# Patient Record
Sex: FEMALE | Race: WHITE | NOT HISPANIC OR LATINO | URBAN - METROPOLITAN AREA
[De-identification: names, ages, dates, MRNs, and addresses within clinical notes are randomized per-mention and may not be internally consistent; named-entity substitution may affect disease eponyms.]

---

## 2023-10-06 ENCOUNTER — ANCILLARY PROCEDURE (OUTPATIENT)
Dept: RADIOLOGY | Facility: CLINIC | Age: 48
End: 2023-10-06
Payer: COMMERCIAL

## 2023-10-06 DIAGNOSIS — M25.552 PAIN IN LEFT HIP: ICD-10-CM

## 2023-10-06 PROCEDURE — 73502 X-RAY EXAM HIP UNI 2-3 VIEWS: CPT | Mod: LT

## 2024-04-30 NOTE — PROGRESS NOTES
Hina Ashby MD   Adult Reconstruction and Joint Replacement Surgery  Phone: [phone]     Fax: [phone]     INITIAL CONSULTATION      Date of Visit:  5/6/2024    CC: Left hip pain    HPI:  Lisa Prater is a 48 y.o. female who presents with 2 years of LEFT hip pain. They were referred by self. Feels she also has some secondary right foot pain.     Patient has tried the following Ice, Activity modification, and Xray. Date of last steroid injection: never. Patient does have pain at night. Patient is able to walk 2-3 blocks. Patient is currently using nothing as assistive device. Primarily complains of groin, buttock, and lateral hip pain. Patient has difficulty with donning and doffing shoes and socks, stairs, and getting in/out of car . The pain is significantly impacting her ability to perform activities of daily living. Patient reports no longer able to do activities such as walking without pain. Left leg feels shorter.    PROMs   No questionnaires on file.     No past medical history on file.  Documented in chart and reviewed.     No past surgical history on file.    Allergies: She has No Known Allergies.     Medications:  Current Outpatient Medications   Medication Instructions    buPROPion XL (Wellbutrin XL) 300 mg 24 hr tablet     DULoxetine (Cymbalta) 60 mg DR capsule     LORazepam (ATIVAN) 1 mg, oral, Daily PRN       No family history on file.  Documented in chart and reviewed.     Social History     Tobacco Use    Smoking status: Former     Types: Cigarettes    Smokeless tobacco: Former   Substance Use Topics    Alcohol use: Not on file       Review of Systems: Review of systems completed with medical assistant intake. Please refer to this note.     Falls: The patient denies any recent falls or fall-related injuries.    Physical Exam:  BMI: >40    Constitutional: The patient is well-appearing and well groomed.     Neurological/Psychiatric: The patient is alert and oriented to person, place  and time. The patient has a normal mood and affect.    Skin Examination: The skin over the right lower extremity, left lower extremity, right upper extremity, and left upper extremity is intact without any evidence of infection or rash.    Cardiovascular Examination: There are no varicosities and the skin is normal temperature, capillary refill normal, arterial pulses normal, no edema.    Lymphatic Examination: There is no lymphatic swelling or palpable lymph nodes present around the involved joint.    Neurological Examination: Bilateral lower extremities are grossly neurologically intact. Sensation normal, motor function normal.    Gait: The patient ambulates with a coxalgic gait.     Lumbar spine:    No tenderness to palpation midline.    Negative straight leg raise bilaterally.    Left Hip Examination:    Examination of the hip reveals the skin to be intact.    There is mild tenderness over the greater trochanter.    There is no obvious swelling.    There is a positive Stinchfield test.    Range of motion is: full extension to 90 degrees of flexion.    The hip internally rotates to 10 degrees and externally rotates to 35 degrees.    Abduction is 35 degrees and adduction is 10 degrees.    There is groin and buttock pain with hip motion.    Right Hip Examination:    Examination of the hip reveals the skin to be intact.    There is no tenderness over the greater trochanter.    There is no obvious swelling.    There is a negative Stinchfield test.    Range of motion is full extension to 100 degrees of flexion.    The hip internally rotates to 20 and externally rotates 40 degrees.    Abduction is 50 degrees and adduction is 20 degrees.    There is no groin and buttock pain with hip motion.    Right Knee Examination:  Examination of the right knee reveals the skin to be intact. There is no obvious swelling.    There is no tenderness to palpation.    Range of motion is full extension to 120 degrees of flexion.    The  "knee is stable.    There is no grinding with range of motion.    There is no patellofemoral crepitus.    Left Knee Examination:  Examination of the left knee reveals the skin to be intact. There is no obvious swelling.    There is no tenderness to palpation.    Range of motion is full extension to 120 degrees of flexion.    The knee is stable.    There is no grinding with range of motion.    There is no patellofemoral crepitus.    Prior Labs:   No results found for: \"WBC\", \"HGB\", \"HCT\", \"MCV\", \"PLT\"   No results found for: \"INR\", \"PROTIME\"      No results found for: \"GLUCOSE\", \"CALCIUM\", \"NA\", \"K\", \"CO2\", \"CL\", \"BUN\", \"CREATININE\"   No results found for: \"CKTOTAL\", \"CKMB\", \"CKMBINDEX\", \"TROPONINI\"   No results found for: \"HGBA1C\"      No results found for: \"CRP\"   No results found for: \"SEDRATE\"     Radiographs:  Radiographs were personally reviewed today with the patient. There is evidence of severe LEFT hip osteoarthritis with bone on bone apposition.    Impression:  48 y.o. year old female presents with severe LEFT hip osteoarthritis with bone on bone apposition. This is now affecting activities of daily living.     Diagnosis:   Primary osteoarthritis of left hip    Obesity, morbid (more than 100 lbs over ideal weight or BMI > 40) (Multi)    Recommendations / Plan:    I have discussed the options in detail with the patient. We have discussed anti-inflammatory medication, activity modification, physical therapy, corticosteroid injections, and total hip replacement surgery. The patient has not yet exhausted all conservative treatment measures.     The risks and benefits of all these treatment options have been discussed in detail. The patient has tried at least 3 months of the above conservative treatments and continues to have disabling pain, impaired activities of daily living and worsened quality of life.  Reviewed the surgical optimization steps to optimize their chances for a successful joint replacement " surgery.  Currently their BMI is >40.  They would need to lose significant weight before being scheduled for surgery. Discussed that obesity is a risk factor for continued progression of osteoarthritis. Each pound of weight loss offloads their hip and knee joints by 3-6 pounds.  The most effective of these options is weight loss mainly through restricting caloric intake.  A referral to a nutritionist was offered and referral was placed.  She was also given a prescription for physical therapy to begin working on strengthening and range of motion of the hip and knee joints.  Discussed the potential for steroid injection to the hip joint at some point in the future.  She will use ibuprofen for management of pain.  Encouraged them to maintain range of motion and strength around the hip and knee joints.  They will continue to implement these strategies in addressing their pain.       Recommend the patient continue optimizing nonsurgical treatment interventions as outlined above for management of their arthritis.  I would be happy to see them again at any point to discuss surgery if they are more optimized.  The patient verbalizes understanding with the recommendations and treatment plan as outlined above and is in agreement.  Questions were addressed.    _____________  Hina Ashby MD   [facility]     Approximately 45 minutes were spent on the following tasks:              Preparing for the patient              Reviewing medical records              Taking a patient history              Performing a physical exam              Reviewing treatment options with the patient              Explaining the risks, potential benefits, and alternative to surgery  Explaining the expected rehabilitation after each treatment option  Explaining the potential long term expectations  Evaluating the diagnostic imaging     This office note was transcribed with dictation software.  Please  excuse any typographical errors, program misunderstandings leading to inadvertent insertions or deletions of inappropriate wording, pronoun errors and other unintentional transcription errors not noticed on proof-reading.

## 2024-05-06 ENCOUNTER — OFFICE VISIT (OUTPATIENT)
Dept: ORTHOPEDIC SURGERY | Facility: HOSPITAL | Age: 49
End: 2024-05-06
Payer: COMMERCIAL

## 2024-05-06 DIAGNOSIS — M16.12 PRIMARY OSTEOARTHRITIS OF LEFT HIP: Primary | ICD-10-CM

## 2024-05-06 DIAGNOSIS — E66.01 OBESITY, MORBID (MORE THAN 100 LBS OVER IDEAL WEIGHT OR BMI > 40) (MULTI): ICD-10-CM

## 2024-05-06 PROCEDURE — 99214 OFFICE O/P EST MOD 30 MIN: CPT | Performed by: STUDENT IN AN ORGANIZED HEALTH CARE EDUCATION/TRAINING PROGRAM

## 2024-05-06 PROCEDURE — 99204 OFFICE O/P NEW MOD 45 MIN: CPT | Performed by: STUDENT IN AN ORGANIZED HEALTH CARE EDUCATION/TRAINING PROGRAM

## 2024-05-06 RX ORDER — BUPROPION HYDROCHLORIDE 300 MG/1
TABLET ORAL
COMMUNITY
Start: 2024-02-26

## 2024-05-06 RX ORDER — DULOXETIN HYDROCHLORIDE 60 MG/1
CAPSULE, DELAYED RELEASE ORAL
COMMUNITY
Start: 2024-02-26

## 2024-05-06 RX ORDER — LORAZEPAM 1 MG/1
1 TABLET ORAL DAILY PRN
COMMUNITY
Start: 2023-09-05

## 2024-05-06 ASSESSMENT — PAIN - FUNCTIONAL ASSESSMENT: PAIN_FUNCTIONAL_ASSESSMENT: 0-10

## 2024-05-06 ASSESSMENT — PAIN SCALES - GENERAL: PAINLEVEL_OUTOF10: 3

## 2024-05-06 ASSESSMENT — PAIN DESCRIPTION - DESCRIPTORS: DESCRIPTORS: ACHING;THROBBING

## 2024-05-13 NOTE — PROGRESS NOTES
"Nutrition Assessment     Reason for Visit:  Lisa Prater is a 48 y.o. female who presents for nutrition counseling seeking weight loss for possible upcoming hip surgery.  Pt has a goal to lose 50#.  Pt would benefit from a weight los of 1-2# per week.  Diet recall reveals that pt has a hectic work schedule and works 60 hrs per week late into the evening.  She often eats dinner very late and resorts to fast food.  Breakfast and lunch are light.  Pt may benefit from trying IF but including high protein snacks and drinks within her 8 hr window.  Also, suggest pt track her intakes on a phone blanca.          No weight available  Anthropometrics  Height: 162.6 cm (5' 4\")  Weight: 111 kg (245 lb)         Food And Nutrient Intake:  Food and Nutrient History  Fluid Intake: 48-60 oz per day of water, black coffee  Food Allergy: none  GI Symptoms: none     Food Intake  Meal 1: black coffee and a bev bar  Meal 2: soup or ramen; peanut butter cracker package- works from home  Meal 3: 9:00: 3 or 4 days per week  Snacks: cheese stick or cookie    Food Preparation  Cooking: Patient          Physical Activities:  Physical Activity  Type of Physical Activity: none         Energy Needs  Calculated Energy Needs Using Equations  Height: 162.6 cm (5' 4\")  Weight Used for Equation Calculations: 111 kg (245 lb)  Rody- St. Bauer Equation (Overweight or Obese Patients): 1731  Activity Factor: 1.2  Total Energy Needs: 1577  Estimated Energy Needs  Total Energy Estimated Needs (kCal): 1670 kCal  Method for Estimating Needs: 15 kcals per kg/actual BW  Estimated Protein Needs  Total Protein Estimated Needs (g): 62 g  Method for Estimating Needs: 20% of est calories        Nutrition Diagnosis   Malnutrition Diagnosis  Patient has Malnutrition Diagnosis: No  Nutrition Diagnosis  Patient has Nutrition Diagnosis: Yes  Nutrition Diagnosis 1: Obese  Related to (1): excessive calories and disordered eating pattern  As Evidenced by (1): BMI, weight " hx    Nutrition Interventions/Recommendations       Consider tracking intake of meals on an blanca such as Active-Semi or AfterYes.  Aim for recommended calories of 1500 per day.   Aim for 20% of your calories coming from protein or 75g of protein daily  2.   Increase marsha protein snacks between meals.  Make them planned snacks such as nuts, hummus and vegetables, apples with cheese stick or peanut butter, or Greek Yogurt such as Two Good.    3.  Consider a high protein drink between meals such as Premier Protein or Fairlife.   4.  Aim to drink 64 oz of water or other non-caloric beverages.    5.  Recommend at least 150 minutes of exercise weekly or 30 minutes 5x/week.         Nutrition Monitoring and Evaluation   Food/Nutrient Related History Monitoring  Monitoring and Evaluation Plan: Energy intake, Meal/snack pattern, Fiber intake, Carbohydrate intake, Amount of food, Fluid intake, Protein intake  Criteria: 1500 calories  Criteria: aim for at least 64 oz of water daily  Criteria: Aim for 2 meals/day with 1-2 snack  Meal/Snack Pattern: Food variety, Estimated meal and snack pattern  Criteria: Follow plate method when planning meals (1/2 plate non-starchy vegetables, 1/4 plate lean protein, 1/4 plate carbohydrates).  Criteria: Choose lean protein sources including chicken, turkey, seafood, and eggs. Be sure to include protein with each meal and snack  Criteria: Increase fiber from fruits, vegetables, whole grains, and beans/lentils  Knowledge/Beliefs/Attitudes  Monitoring and Evaluation Plan: Physical activity  Physical activity: Physical activity history  Criteria: Encouraged regular physical activity including strength training as medically appropriate per AHA guidelines  Body Composition/Growth/Weight History  Monitoring and Evaluation Plan: Weight change  Weight Change: Weight change intent  Criteria: 1-2 lb wt loss per week               Readiness to Change : Good  Level of Understanding : Good  Anticipated  Compliant : Good

## 2024-05-14 ENCOUNTER — TELEMEDICINE CLINICAL SUPPORT (OUTPATIENT)
Dept: NUTRITION | Facility: CLINIC | Age: 49
End: 2024-05-14
Payer: COMMERCIAL

## 2024-05-14 VITALS — HEIGHT: 64 IN | BODY MASS INDEX: 41.83 KG/M2 | WEIGHT: 245 LBS

## 2024-05-14 DIAGNOSIS — E66.01 OBESITY, MORBID (MORE THAN 100 LBS OVER IDEAL WEIGHT OR BMI > 40) (MULTI): ICD-10-CM

## 2024-05-14 PROCEDURE — 97802 MEDICAL NUTRITION INDIV IN: CPT | Mod: 95 | Performed by: DIETITIAN, REGISTERED

## 2024-06-03 ENCOUNTER — EVALUATION (OUTPATIENT)
Dept: PHYSICAL THERAPY | Facility: CLINIC | Age: 49
End: 2024-06-03
Payer: COMMERCIAL

## 2024-06-03 DIAGNOSIS — M16.12 PRIMARY OSTEOARTHRITIS OF LEFT HIP: Primary | ICD-10-CM

## 2024-06-03 PROCEDURE — 97140 MANUAL THERAPY 1/> REGIONS: CPT | Mod: GP | Performed by: PHYSICAL THERAPIST

## 2024-06-03 PROCEDURE — 97110 THERAPEUTIC EXERCISES: CPT | Mod: GP | Performed by: PHYSICAL THERAPIST

## 2024-06-03 PROCEDURE — 97161 PT EVAL LOW COMPLEX 20 MIN: CPT | Mod: GP | Performed by: PHYSICAL THERAPIST

## 2024-06-03 ASSESSMENT — PATIENT HEALTH QUESTIONNAIRE - PHQ9
1. LITTLE INTEREST OR PLEASURE IN DOING THINGS: NOT AT ALL
SUM OF ALL RESPONSES TO PHQ9 QUESTIONS 1 AND 2: 0
2. FEELING DOWN, DEPRESSED OR HOPELESS: NOT AT ALL

## 2024-06-03 ASSESSMENT — ENCOUNTER SYMPTOMS
LOSS OF SENSATION IN FEET: 0
DEPRESSION: 0
OCCASIONAL FEELINGS OF UNSTEADINESS: 0

## 2024-06-03 ASSESSMENT — PAIN - FUNCTIONAL ASSESSMENT: PAIN_FUNCTIONAL_ASSESSMENT: 0-10

## 2024-06-03 ASSESSMENT — PAIN SCALES - GENERAL: PAINLEVEL_OUTOF10: 3

## 2024-06-03 NOTE — PROGRESS NOTES
"      Physical Therapy  Physical Therapy Orthopedic Evaluation      Patient Name: Lisa Prater  MRN: [id number]  Today's Date: 6/3/2024  Time Calculation  Start Time: 0920  Stop Time: 1005  Time Calculation (min): 45 min  PT Evaluation Time Entry  PT Evaluation (Low) Time Entry: 18  PT Therapeutic Procedures Time Entry  Manual Therapy Time Entry: 10  Therapeutic Exercise Time Entry: 15       Insurance:    Number of Treatments Authorized: 1 of 60        Insurance Type: Payor: [employer] / Plan: [employer] HEALTH PLAN / Product Type: *No Product type* /     Current Problem  1. Primary osteoarthritis of left hip  Referral to Physical Therapy    Follow Up In Physical Therapy          General:  Reason for Referral: L hip pain  Referred By: Dr. Hina Ashby    Past Medical History       Precautions:   Precautions  STEADI Fall Risk Score (The score of 4 or more indicates an increased risk of falling): 0  Precautions Comment: None    Medical History Form: Reviewed (scanned into chart)    Subjective:   Subjective   General Comment: Patient had some increased L hip pain that she didn't address for a prolonged period of time with no specific KATHRYN and insidious onset. She notes that approximately 9 months ago, she went to the MD as the pain increased. She notes that since that time, she has developed increased compesatory gait and movement patterns due to pain. She notes that she has increased R heel pain that began ~Sept. 2023 and has worsened.    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          Red Flags: Do you have any of the following?         Red Flags: None    Objective:  Objective   HIP    Observation  Observation Comment: Unremarkable  Hip Palpation/Joint Mobility   Palpation / Joint Mobility Comment: No tenderness to palpation; Joint mobility long axis distraction and lateral glide hypomobile L hip with \"dec symptoms\"  Lumbar AROM  Lumbar flexion: (60°): WNL // #1 at end-range 5/  Hip AROM  R hip flexion: (125°): 95°  L hip flexion: (125°): 95° (#1)  R hip abduction: (45°): 45°  L hip abduction: (45°): 33°  R hip ER: (45°): 55°  L hip ER: (45°): 35° (#1)  R hip IR: (45°): 10°  L hip IR: (45°): -10° (#1)  Hip PROM  L hip flexion: (125°): 97° (#1)  L hip abduction: (45°): 36°  L hip ER: (45°): 36° (#1)  L hip IR: (45°): -8° (#1)  Specific Lower Extremity MMT  R Iliopsoas: (5/5): 4/5  L Iliopsoas: (5/5): 4-/5  R Gluteals (prone): (5/5): 4/5  L Gluteals (prone): (5/5): 3+/5  R Gluteals (sidelying): (5/5): 4+/5  L Gluteals (sidelying): (5/5): 4-/5  R knee flexion: (5/5): 5/5  L knee flexion: (5/5): 5/5  R knee extension: (5/5): 5/5  L knee extension: (5/5): 5/5    Special Tests  SAMANTHA: (Negative): Elevated to opposite leg  Gait  Gait Comment: Compensated gait pattern during L stance with decreased stride and stance time  Flexibility NT this visit       Outcome Measures:  Other Measures  Lower Extremity Funtional Score (LEFS): 26/80 (32.5%)     Treatment Performed:  Therapeutic Exercise  Therapeutic Exercise Activity 1: HEP Education and demonstration with sets and reps as noted. Pt with good understanding and demonstration.    Manual " Therapy  Manual Therapy Activity 1: Long Axis Distraction L, Gr. III, supine  Manual Therapy Activity 2: IR/ER L Gr. II in supine for pain management      Outpatient Education  Individual(s) Educated: Patient  Education Provided: Home Exercise Program  Patient/Caregiver Demonstrated Understanding: yes  Education Comment: Access Code: [email or web address]  URL: [email or web address]  Date: 06/03/2024  Prepared by: Zhou Savage    Exercises  - Bent Knee Fallouts  - 1 x daily - 5 x weekly - 2 sets - 10 reps  - Supine Knees to Chest with Swiss Ball  - 1 x daily - 5 x weekly - 2 sets - 10 reps  - Supine Lower Trunk Rotation with Swiss Ball  - 1 x daily - 5 x weekly - 2 sets - 10 reps  - Standing Hip Abduction with Counter Support  - 1 x daily - 5 x weekly - 2 sets - 10 reps    Assessment:   Patient is 48 y.o. year old who presents to physical therapy with signs and symptoms consistent with left hip pain possibly due to osteoarthritis. Patient has decreased ROM and strength limiting functional mobility and ADLs. Patient would benefit from skilled physical therapy in order to address the stated deficits and return to daily tasks with reduced pain and improved function. Patient has had fear avoidant beliefs as well as self-limiting behavior over the past year which could possibly have impacted patient tolerance to functional activities. Will assess patient's overall response to physical therapy and determine if any change to the plan of care should be performed.     Personal Factors Affecting Care:   BMI > 40    SINSS:  Severity: Moderate  Irritability: Moderate  Nature: MSK L hip   Stage: Sub-Acute  Stability: Stable and/or uncomplicated characteristics    Rehab Prognosis: Good      Plan:  Treatment/Interventions: Electrical stimulation, Education/ Instruction, Dry needling, Neuromuscular re-education, Self care/ home management, Therapeutic activities, Therapeutic exercises, Manual therapy, Blood flow  restriction therapy, Cryotherapy  PT Plan: Skilled PT  PT Frequency: 2 times per week  Duration: 6 weeks  Onset Date: 06/01/23  Rehab Potential: Good    Goals: Set and discussed today  Active       PT Problem       STG       Start:  06/03/24    Expected End:  06/24/24       1) Patient will improve LEFS score by 9 points in order to perform functional activities at home and in the community.  2) Patient will be able to complete ADLs with pain less than 5/10 in hip.  3) Pt will improve hip flexion ROM by 5 degrees to be able to complete ADLs with less difficult.  4) Patient will be independent with HEP to allow for continued improvement in daily tasks at home and in the community in 3 visits.              LTG        Start:  06/03/24    Expected End:  07/15/24       1) Patient will have 5/5 strength in lateral hip musculature to aid in stability with ambulation on varied surfaces in community.  2) Patient will be able to perform proper squatting technique in order to prevent increased pain with daily tasks.  3) Patient will be able to perform >30 seconds of SLS on even ground in order to allow for safe ambulation and reduced fall risk within the community.  4) Patient will improve LEFS score by to >/=70/80 in order to perform functional activities at home and in the community by discharge.               Plan of care was developed with input and agreement by the patient        Zhou Savage, PT      This note was dictated with voice recognition software. It has not been proofread for grammatical errors, typographical mistakes or other semantic inconsistencies.

## 2024-06-12 ENCOUNTER — TREATMENT (OUTPATIENT)
Dept: PHYSICAL THERAPY | Facility: CLINIC | Age: 49
End: 2024-06-12
Payer: COMMERCIAL

## 2024-06-12 DIAGNOSIS — M16.12 PRIMARY OSTEOARTHRITIS OF LEFT HIP: ICD-10-CM

## 2024-06-12 PROCEDURE — 97140 MANUAL THERAPY 1/> REGIONS: CPT | Mod: GP,CQ

## 2024-06-12 PROCEDURE — 97110 THERAPEUTIC EXERCISES: CPT | Mod: GP,CQ

## 2024-06-12 ASSESSMENT — PAIN SCALES - GENERAL: PAINLEVEL_OUTOF10: 2

## 2024-06-12 ASSESSMENT — PAIN - FUNCTIONAL ASSESSMENT: PAIN_FUNCTIONAL_ASSESSMENT: 0-10

## 2024-06-12 NOTE — PROGRESS NOTES
Physical Therapy Treatment    Patient Name: Lisa Prater  MRN: [id number]  Today's Date: 6/12/2024  Time Calculation  Start Time: 1122  Stop Time: 1202  Time Calculation (min): 40 min  PT Therapeutic Procedures Time Entry  Manual Therapy Time Entry: 22  Therapeutic Exercise Time Entry: 18,      Current Problem  1. Primary osteoarthritis of left hip  Follow Up In Physical Therapy            Insurance:  Payor: [employer] / Plan: [employer] HEALTH PLAN / Product Type: *No Product type* /   Number of Treatments Authorized: 2 of 60          Subjective   General  Reason for Referral: L hip pain  Referred By: Dr. Hina Ashby  Past Medical History Relevant to Rehab: REVIEWED MEDICAL HISTORY SM  General Comment: PT STATES NO CHANGE IN HER SYMPTOMS YET.    Performing HEP?: Yes    Precautions  Precautions  Precautions Comment: None  Pain  Pain Assessment: 0-10  Pain Score: 2  Pain Location: Hip  Pain Orientation: Left    Objective   General Observation  General Observation: ANTALGIC GAIT       Treatments:    Therapeutic Exercise  Therapeutic Exercise Activity 1: SCIFIT MAN L2 X 6 MIN  Therapeutic Exercise Activity 2: KNEELING ON STOOL: L HIP FLEX, IR, ER X 2 MIN EACH  Therapeutic Exercise Activity 3: SBALL LB ROT X 2 MIN  Therapeutic Exercise Activity 4: SBALL DKTC WITH UE PULL X 2 MIN  Therapeutic Exercise Activity 5: HL L KNEE FALLOUT X 2 MIN         Manual Therapy  Manual Therapy Activity 1: MFR L LEG PULL  Manual Therapy Activity 2: PROM L HIP FLEX, IR, ER, ADD  Manual Therapy Activity 3: L HIP INF, LAT MOBS GRADE 3                   OP EDUCATION:  Outpatient Education  Education Comment: CONTINUE WITH CURRENT HEP    Assessment:  PT Assessment  Assessment Comment: PT MANJIT EX'S WELL.  SHE IF VERY HYPOMOBILE IN HER L HIP.  PT DID FEEL LOOSER AND HAD LESS PAIN AFTER MANUAL.    Plan:  OP PT Plan  Treatment/Interventions: Electrical stimulation, Education/ Instruction, Dry needling, Neuromuscular re-education, Self care/ home  management, Therapeutic activities, Therapeutic exercises, Manual therapy, Blood flow restriction therapy, Cryotherapy  PT Plan: Skilled PT  PT Frequency: 2 times per week  Duration: 6 weeks  Onset Date: 06/01/23  Number of Treatments Authorized: 2 of 60  Rehab Potential: Good  Plan of Care Agreement: Patient    Goals:  Active       PT Problem            Corey Wong, PTA

## 2024-06-14 ENCOUNTER — TREATMENT (OUTPATIENT)
Dept: PHYSICAL THERAPY | Facility: CLINIC | Age: 49
End: 2024-06-14
Payer: COMMERCIAL

## 2024-06-14 DIAGNOSIS — M16.12 PRIMARY OSTEOARTHRITIS OF LEFT HIP: ICD-10-CM

## 2024-06-14 PROCEDURE — 97110 THERAPEUTIC EXERCISES: CPT | Mod: GP,CQ

## 2024-06-14 PROCEDURE — 97140 MANUAL THERAPY 1/> REGIONS: CPT | Mod: GP,CQ

## 2024-06-14 ASSESSMENT — PAIN - FUNCTIONAL ASSESSMENT: PAIN_FUNCTIONAL_ASSESSMENT: 0-10

## 2024-06-14 ASSESSMENT — PAIN SCALES - GENERAL: PAINLEVEL_OUTOF10: 4

## 2024-06-14 NOTE — PROGRESS NOTES
Physical Therapy Treatment    Patient Name: Lisa Prater  MRN: [id number]  Today's Date: 6/14/2024  Time Calculation  Start Time: 0952  Stop Time: 1032  Time Calculation (min): 40 min  PT Therapeutic Procedures Time Entry  Manual Therapy Time Entry: 20  Therapeutic Exercise Time Entry: 20,      Current Problem  1. Primary osteoarthritis of left hip  Follow Up In Physical Therapy            Insurance:  Payor: [employer] / Plan: [employer] HEALTH PLAN / Product Type: *No Product type* /   Number of Treatments Authorized: 3 of 60          Subjective   General  Reason for Referral: L hip pain  Referred By: Dr. Hina Ashby  Past Medical History Relevant to Rehab: REVIEWED MEDICAL HISTORY SM  General Comment: PT STATES SHE WAS A LITTLE SORE AFTER LAST VISIT. FELT BETTER YESTERDAY.    Performing HEP?: Yes    Precautions  Precautions  Precautions Comment: None  Pain  Pain Assessment: 0-10  Pain Score: 4  Pain Location: Hip  Pain Orientation: Left    Objective   General Observation  General Observation: ANTALGIC GAIT       Treatments:    Therapeutic Exercise  Therapeutic Exercise Activity 1: SCIFIT MAN L2 X 6 MIN  Therapeutic Exercise Activity 2: KNEELING ON STOOL: L HIP FLEX, IR, ER X 2 MIN EACH  Therapeutic Exercise Activity 3: SBALL LB ROT X 2 MIN  Therapeutic Exercise Activity 4: SBALL DKTC WITH UE PULL X 2 MIN  Therapeutic Exercise Activity 5: HL L KNEE FALLOUT X 2 MIN  Therapeutic Exercise Activity 6: MINI SQUATS X 1 MIN  Therapeutic Exercise Activity 7: STANDING HIP ABD R/L ALT X 2 MIN         Manual Therapy  Manual Therapy Activity 1: MFR L LEG PULL  Manual Therapy Activity 2: PROM L HIP FLEX, IR, ER, ADD  Manual Therapy Activity 3: L HIP INF, LAT MOBS GRADE 3                   OP EDUCATION:  Outpatient Education  Education Comment: CONTINUE WITH CURRENT HEP    Assessment:  PT Assessment  Assessment Comment: PT MANJIT EX'S WELL.  SHE CONTINUES TO BE HYPOMOBILE IN HER L HIP AND TIGHT IN HER L LE MUSCLES.  PT FELT SORE BUT  LOOSER AFTER SESSION.    Plan:  OP PT Plan  Treatment/Interventions: Electrical stimulation, Education/ Instruction, Dry needling, Neuromuscular re-education, Self care/ home management, Therapeutic activities, Therapeutic exercises, Manual therapy, Blood flow restriction therapy, Cryotherapy  PT Plan: Skilled PT  PT Frequency: 2 times per week  Duration: 6 weeks  Onset Date: 06/01/23  Number of Treatments Authorized: 3 of 60  Rehab Potential: Good  Plan of Care Agreement: Patient    Goals:  Active       PT Problem            Corey Wong, PTA

## 2024-06-18 ENCOUNTER — TREATMENT (OUTPATIENT)
Dept: PHYSICAL THERAPY | Facility: CLINIC | Age: 49
End: 2024-06-18
Payer: COMMERCIAL

## 2024-06-18 DIAGNOSIS — M16.12 PRIMARY OSTEOARTHRITIS OF LEFT HIP: ICD-10-CM

## 2024-06-18 PROCEDURE — 97140 MANUAL THERAPY 1/> REGIONS: CPT | Mod: GP | Performed by: PHYSICAL THERAPIST

## 2024-06-18 PROCEDURE — 97110 THERAPEUTIC EXERCISES: CPT | Mod: GP | Performed by: PHYSICAL THERAPIST

## 2024-06-18 ASSESSMENT — PAIN - FUNCTIONAL ASSESSMENT: PAIN_FUNCTIONAL_ASSESSMENT: 0-10

## 2024-06-18 ASSESSMENT — PAIN SCALES - GENERAL: PAINLEVEL_OUTOF10: 5 - MODERATE PAIN

## 2024-06-18 NOTE — PROGRESS NOTES
Physical Therapy Treatment    Patient Name: Lisa Prater  MRN: [id number]  Today's Date: 6/18/2024  Time Calculation  Start Time: 0950  Stop Time: 1035  Time Calculation (min): 45 min  PT Therapeutic Procedures Time Entry  Manual Therapy Time Entry: 10  Neuromuscular Re-Education Time Entry: 5  Therapeutic Exercise Time Entry: 26       Current Problem  1. Primary osteoarthritis of left hip  Follow Up In Physical Therapy          Insurance:  Number of Treatments Authorized: 4 of 60        Payor: [employer] / Plan: [employer] HEALTH PLAN / Product Type: *No Product type* /     Subjective   General  Reason for Referral: L hip pain  Referred By: Dr. Hina Ashby  Past Medical History Relevant to Rehab: REVIEWED MEDICAL HISTORY SM  General Comment: Patient reports she is has pain in the L hip coming into session today. She states after the last session she was experiencing some sorenss and pain. She reports her hip felt good yesterday so she walked in the pool for an hour and a half and when she got out she felt pain in her hip that has been present since then. Patient reports she is performing her HEP and feels like she is having trouble stabilizing herself to isolate hip movements.    Performing HEP?: Yes    Precautions  Precautions  Precautions Comment: None  Pain  Pain Assessment: 0-10  Pain Score: 5 - Moderate pain  Pain Location: Hip  Pain Orientation: Left       Objective   HIP    Observation  Observation Comment: Unremarkable    Gait  Gait Comment: Compensated gait pattern during L stance with decreased stride and stance time    General Observation  General Observation: ANTALGIC GAIT    Treatments:    Therapeutic Exercise  Therapeutic Exercise Activity 1: SCIFIT MAN L2 X 6 MIN  Therapeutic Exercise Activity 2: Standing hip abduction, 3x 10 B  Therapeutic Exercise Activity 3: Standing hip extension, 3x 10 B  Therapeutic Exercise Activity 4: Tiltboard, M/L, F/B, 2 min each  Therapeutic Exercise Activity 5: Alt toe taps  "on 8inch step, 2 min.  Therapeutic Exercise Activity 6: Total gym, 2x 2 min.  Therapeutic Exercise Activity 7: Bridges, 2x 10.  Therapeutic Exercise Activity 8: DKTC, small red ball, 2 min.    Balance/Neuromuscular Re-Education  Balance/Neuromuscular Re-Education Activity 1: SLS at // bars, B, 3x 30s.    Manual Therapy  Manual Therapy Activity 1: MFR L LEG PULL  Manual Therapy Activity 2: PROM L HIP FLEX, IR, ER, ADD      OP EDUCATION:  Outpatient Education  Individual(s) Educated: Patient  Education Provided: Home Exercise Program  Patient/Caregiver Demonstrated Understanding: yes  Education Comment: CONTINUE WITH CURRENT HEP    Assessment:  PT Assessment  Rehab Prognosis: Good  Assessment Comment: Patient presents with pain and soreness in L hip. Patient tolerated session well today. Patient tolerated WB exercises well. Patient showed signs of fatigue and difficulty in single leg stance and alt. toe taps on a step, demonstrated by visible unsteadinss in stance leg and reports from the patient stating she feels like she has \"difficulty stabilizing with her L leg.\" Patient can continue to progress LE strength and balance exercises as tolerated to decrease pain in L hip to perform typical ADLs without difficulty.    Plan:  Treatment/Interventions: Electrical stimulation, Education/ Instruction, Dry needling, Neuromuscular re-education, Self care/ home management, Therapeutic activities, Therapeutic exercises, Manual therapy, Blood flow restriction therapy, Cryotherapy  PT Plan: Skilled PT  PT Frequency: 2 times per week  Duration: 6 weeks  Onset Date: 06/01/23  Rehab Potential: Good    Goals:  Active       PT Problem            SANDHYA FAIR, S-PT    This note was dictated with voice recognition software. It has not been proofread for grammatical errors, typographical mistakes or other semantic inconsistencies.   "

## 2024-06-21 ENCOUNTER — TREATMENT (OUTPATIENT)
Dept: PHYSICAL THERAPY | Facility: CLINIC | Age: 49
End: 2024-06-21
Payer: COMMERCIAL

## 2024-06-21 DIAGNOSIS — M16.12 PRIMARY OSTEOARTHRITIS OF LEFT HIP: ICD-10-CM

## 2024-06-21 PROCEDURE — 97110 THERAPEUTIC EXERCISES: CPT | Mod: GP | Performed by: PHYSICAL THERAPIST

## 2024-06-21 PROCEDURE — 97140 MANUAL THERAPY 1/> REGIONS: CPT | Mod: GP | Performed by: PHYSICAL THERAPIST

## 2024-06-21 ASSESSMENT — PAIN - FUNCTIONAL ASSESSMENT: PAIN_FUNCTIONAL_ASSESSMENT: 0-10

## 2024-06-21 ASSESSMENT — PAIN SCALES - GENERAL: PAINLEVEL_OUTOF10: 3

## 2024-06-21 NOTE — PROGRESS NOTES
Physical Therapy Treatment    Patient Name: Lisa Prater  MRN: [id number]  Today's Date: 6/21/2024             Current Problem  1. Primary osteoarthritis of left hip  Follow Up In Physical Therapy          Insurance:  Number of Treatments Authorized: 5 of 60        Payor: [employer] / Plan: [employer] HE[employer] PLAN / Product Type: *No Product type* /     Subjective   General  Reason for Referral: L hip pain  Referred By: Dr. Hina Ashby  Past Medical History Relevant to Rehab: REVIEWED MEDICAL HISTORY SM  General Comment: Patient reports she feels her typical pain and soreness today with no symptoms out of the ordinary. Patient reports her HEP seems to help when shes having pain. After last session patient went home and took some tylenol and said it helped her hip. At the conclusion of the session patient reported her hip felt more stretched out and  better in general.    Performing HEP?: Yes    Precautions  Precautions  Precautions Comment: None  Pain  Pain Assessment: 0-10  0-10 (Numeric) Pain Score: 3  Pain Location: Hip  Pain Orientation: Left       Objective   HIP    Gait  Gait Comment: Compensated gait pattern during L stance with decreased stride and stance time      General Observation  General Observation: ANTALGIC GAIT      Treatments:    Therapeutic Exercise  Therapeutic Exercise Activity 1: ScitFit, L2.5, 6min  Therapeutic Exercise Activity 2: PROM L hip flex, abd, add, IR, ER, ext.  Therapeutic Exercise Activity 3: Side stepping at // bars, yellow tband around ankles, 2x 1min  Therapeutic Exercise Activity 4: Forward/ Backward wide steps at // bars, 2x 1min  Therapeutic Exercise Activity 5: Alt toe taps on 8inch step, 2 min.  Therapeutic Exercise Activity 6: Tiltboard M/L, F/B, 2min each direction  Therapeutic Exercise Activity 7: Bridges, 2x 10.  Therapeutic Exercise Activity 8: Hip add. ball squeeze (purple ball), 2x 1min    Balance/Neuromuscular Re-Education  Balance/Neuromuscular Re-Education Activity 1:  "Tiltboard balance M/L, F/B, 30sec each  Balance/Neuromuscular Re-Education Activity 2: Forward toe taps to 3 cones, balance on L leg, 2x 1min    Manual Therapy  Manual Therapy Activity 1: Long Axis Distraction L hip, Gr. III, supine             Assessment:  PT Assessment  Rehab Prognosis: Good  Assessment Comment: Patient presents with familiar L hip pain and soreness. Patient tolerated session well today and showed most difficulty with single leg stance on L LE, demonstrated by visibile shaking with longer repetitions and reports of feeling \"unsteady on the left leg.\" Patient showed signs of difficulty with balance demonstrated by the need for mild UE support on parallel bars. Patient showed fatigue towards the end of standing balance due to visible sweat and redness in the face. Skilled physical therapy is needed to address LE strength, ROM, and balance deficits.    Plan:  Treatment/Interventions: Electrical stimulation, Education/ Instruction, Dry needling, Neuromuscular re-education, Self care/ home management, Therapeutic activities, Therapeutic exercises, Manual therapy, Blood flow restriction therapy, Cryotherapy  PT Plan: Skilled PT  PT Frequency: 2 times per week  Duration: 6 weeks  Onset Date: 06/01/23  Rehab Potential: Good    Goals:  Active       PT Problem            SANDHYA FAIR, S-PT    This note was dictated with voice recognition software. It has not been proofread for grammatical errors, typographical mistakes or other semantic inconsistencies.   "

## 2024-06-25 ENCOUNTER — TREATMENT (OUTPATIENT)
Dept: PHYSICAL THERAPY | Facility: CLINIC | Age: 49
End: 2024-06-25
Payer: COMMERCIAL

## 2024-06-25 DIAGNOSIS — M16.12 PRIMARY OSTEOARTHRITIS OF LEFT HIP: ICD-10-CM

## 2024-06-25 PROCEDURE — 97110 THERAPEUTIC EXERCISES: CPT | Mod: GP | Performed by: PHYSICAL THERAPIST

## 2024-06-25 PROCEDURE — 97140 MANUAL THERAPY 1/> REGIONS: CPT | Mod: GP | Performed by: PHYSICAL THERAPIST

## 2024-06-25 ASSESSMENT — PAIN - FUNCTIONAL ASSESSMENT: PAIN_FUNCTIONAL_ASSESSMENT: 0-10

## 2024-06-25 ASSESSMENT — PAIN SCALES - GENERAL: PAINLEVEL_OUTOF10: 5 - MODERATE PAIN

## 2024-06-25 NOTE — PROGRESS NOTES
Physical Therapy Treatment    Patient Name: Lisa Prater  MRN: [id number]  Today's Date: 6/25/2024  Time Calculation  Start Time: 1515  Stop Time: 1600  Time Calculation (min): 45 min  PT Therapeutic Procedures Time Entry  Manual Therapy Time Entry: 10  Therapeutic Exercise Time Entry: 28       Current Problem  1. Primary osteoarthritis of left hip  Follow Up In Physical Therapy          Insurance:  Number of Treatments Authorized: 6 of 60        Payor: [employer] / Plan: [employer] HEALTH PLAN / Product Type: *No Product type* /     Subjective   General  Reason for Referral: L hip pain  Referred By: Dr. Hina Ashby  Past Medical History Relevant to Rehab: REVIEWED MEDICAL HISTORY SM  General Comment: Patient reports she is in 5/10 pain coming in today that is a familiar, pulsing pain between her L hip and knee. She states she exercised in the pool last night for 30 minutes and believes she over did it. When she woke up this morning her pain was 7/10. Patient also had to work in the office this morning which she believes to have contributed to her pain. At the conclusion of today's session patient reported her hip felt more loose and her pain had decreased to a 3/10.    Performing HEP?: Yes    Precautions  Precautions  Precautions Comment: None  Pain  Pain Assessment: 0-10  0-10 (Numeric) Pain Score: 5 - Moderate pain  Pain Location: Hip  Pain Orientation: Left       Objective   HIP    Gait Comment: Compensated gait pattern during L stance with decreased stride and stance time      General Observation  General Observation: ANTALGIC GAIT    Treatments:    Therapeutic Exercise  Therapeutic Exercise Activity 1: ScitFit, L2.5, 6min  Therapeutic Exercise Activity 2: PROM hip extension, IR, ER, abd, and flex.  Therapeutic Exercise Activity 3: Bridge with hip adduction ball squeeze, 2x 10  Therapeutic Exercise Activity 4: Isometric hooklying clamshell, blue tband around knees, 3x 30sec hold  Therapeutic Exercise Activity 5:  DKTC, small red bad, 2 min  Therapeutic Exercise Activity 6: Kneeling on stool, hip ext., 2x 10  Therapeutic Exercise Activity 7: Kneeling on stool, hip IR and ER, 2x 10 ea  Therapeutic Exercise Activity 8: Lateral walking at // bars, yellow tband, 2 min  Therapeutic Exercise Activity 9: Banded hip alt. flex., abd., and ext., yellow band, 10 ea leg  Therapeutic Exercise Activity 10: Forward alt. toe taps, 8inch step, 2min  Therapeutic Exercise Activity 11: Seated marches, R/L, 2x 10 ea leg         Manual Therapy  Manual Therapy Activity 1: Long Axis Distraction L hip, Gr. III, supine    Therapeutic Activity  Therapeutic Activity 1: R/L forward step ups, 4inch step 1x 10 ea leg         Assessment:  PT Assessment  Rehab Prognosis: Good  Assessment Comment: Patient had a decreased tolerance to exercise today. Patient came in with increased hip pain today due to activities at home, so less WB exercises were performed this session. Balance training was avoided this session due to reports of increases in hip pain during WB therapeutic exercise. Skilled physical therapy is still needed to address L LE ROM, strength, and balance deficits. Previous strengthening and balance training can be resumed and progressed as tolerated by patient.    Plan:  Treatment/Interventions: Electrical stimulation, Education/ Instruction, Dry needling, Neuromuscular re-education, Self care/ home management, Therapeutic activities, Therapeutic exercises, Manual therapy, Blood flow restriction therapy, Cryotherapy  PT Plan: Skilled PT  PT Frequency: 2 times per week  Duration: 6 weeks  Onset Date: 06/01/23  Rehab Potential: Good    Goals:  Active       PT Problem            REYES LARIOS-PT    This note was dictated with voice recognition software. It has not been proofread for grammatical errors, typographical mistakes or other semantic inconsistencies.

## 2024-06-27 ENCOUNTER — APPOINTMENT (OUTPATIENT)
Dept: RADIOLOGY | Facility: CLINIC | Age: 49
End: 2024-06-27
Payer: COMMERCIAL

## 2024-06-27 DIAGNOSIS — Z12.31 SCREENING MAMMOGRAM FOR BREAST CANCER: ICD-10-CM

## 2024-06-28 ENCOUNTER — TREATMENT (OUTPATIENT)
Dept: PHYSICAL THERAPY | Facility: CLINIC | Age: 49
End: 2024-06-28
Payer: COMMERCIAL

## 2024-06-28 DIAGNOSIS — M16.12 PRIMARY OSTEOARTHRITIS OF LEFT HIP: ICD-10-CM

## 2024-06-28 PROCEDURE — 97140 MANUAL THERAPY 1/> REGIONS: CPT | Mod: GP | Performed by: PHYSICAL THERAPIST

## 2024-06-28 PROCEDURE — 97112 NEUROMUSCULAR REEDUCATION: CPT | Mod: GP | Performed by: PHYSICAL THERAPIST

## 2024-06-28 PROCEDURE — 97110 THERAPEUTIC EXERCISES: CPT | Mod: GP | Performed by: PHYSICAL THERAPIST

## 2024-06-28 ASSESSMENT — PAIN SCALES - GENERAL: PAINLEVEL_OUTOF10: 3

## 2024-06-28 ASSESSMENT — PAIN - FUNCTIONAL ASSESSMENT: PAIN_FUNCTIONAL_ASSESSMENT: 0-10

## 2024-06-28 NOTE — PROGRESS NOTES
Physical Therapy Treatment    Patient Name: Lisa Prater  MRN: [id number]  Today's Date: 6/28/2024  Time Calculation  Start Time: 1000  Stop Time: 1048  Time Calculation (min): 48 min  PT Therapeutic Procedures Time Entry  Manual Therapy Time Entry: 10  Neuromuscular Re-Education Time Entry: 8  Therapeutic Exercise Time Entry: 20  Therapeutic Activity Time Entry: 8       Current Problem  1. Primary osteoarthritis of left hip  Follow Up In Physical Therapy          Insurance:  Number of Treatments Authorized: 7 of 60        Payor: [employer] / Plan: [employer] HEALTH PLAN / Product Type: *No Product type* /     Subjective   General  Reason for Referral: L hip pain  Referred By: Dr. Hina Ashby  Past Medical History Relevant to Rehab: REVIEWED MEDICAL HISTORY SM  General Comment: Patient reports her hip is feeling good today with the usual pain. She states she was tired after last session. She came in last session with an increase in pain due to activities earlier in the day, and she states that pain lingered through the afternoon and then went away (never worsening).    Performing HEP?: Yes    Precautions  Precautions  Precautions Comment: None  Pain  Pain Assessment: 0-10  0-10 (Numeric) Pain Score: 3  Pain Location: Hip  Pain Orientation: Left       Objective   HIP    Gait  Gait Comment: Compensated gait pattern during L stance with decreased stride and stance time    General Observation  General Observation: ANTALGIC GAIT    Treatments:    Therapeutic Exercise  Therapeutic Exercise Activity 1: ScitFit, L2, 6min  Therapeutic Exercise Activity 2: R ip PROM, flex, IR, ER, ext., abd.  Therapeutic Exercise Activity 3: Hooklying alt. clamshells, green tband, 2x 10 R/L  Therapeutic Exercise Activity 4: Glute bridges, 2x 10 with a pause  Therapeutic Exercise Activity 5: Hooklying hip add. ball squeezes, 5x 30sec hold    Balance/Neuromuscular Re-Education  Balance/Neuromuscular Re-Education Activity 1: Alt toe taps on airex,  8inch step, 3x 10 ea  Balance/Neuromuscular Re-Education Activity 2: SLS at // bars, 2x 30 ea leg    Manual Therapy  Manual Therapy Activity 1: Long Axis Distraction L hip, Gr. III, supine    Therapeutic Activity  Therapeutic Activity 1: Sit to stand, 2x 10  Therapeutic Activity 2: R/L forward step ups, 6inch step, 2x 10 ea leg         Assessment:  PT Assessment  Rehab Prognosis: Good  Assessment Comment: Patient tolerated treatment fair today with improved tolerance to exercises compared today due to decreased difficulty. Introduced exercises today with a monitored pace to minimize risk of increased symptoms. Patient continues to have difficulty with WBing tasks particularly sit-to-stand transfers and all SL tasks. Will continue to monitor response overall and adjust accordingly.    Plan:  Treatment/Interventions: Electrical stimulation, Education/ Instruction, Dry needling, Neuromuscular re-education, Self care/ home management, Therapeutic activities, Therapeutic exercises, Manual therapy, Blood flow restriction therapy, Cryotherapy  PT Plan: Skilled PT  PT Frequency: 2 times per week  Duration: 6 weeks  Onset Date: 06/01/23  Rehab Potential: Good    Goals:  Active       PT Problem            REYES LARIOS-PT    This note was dictated with voice recognition software. It has not been proofread for grammatical errors, typographical mistakes or other semantic inconsistencies.

## 2025-01-03 ENCOUNTER — DOCUMENTATION (OUTPATIENT)
Dept: PHYSICAL THERAPY | Facility: CLINIC | Age: 50
End: 2025-01-03
Payer: COMMERCIAL

## 2025-01-03 DIAGNOSIS — M16.12 PRIMARY OSTEOARTHRITIS OF LEFT HIP: ICD-10-CM

## 2025-01-03 NOTE — PROGRESS NOTES
Discharge Summary    Name: Lisa Prater  MRN: [id number]  : 1975  Date: 25    Discharge Summary: Physical Therapy    Discharge Information: Date of evaluation 6/3/2024    Therapy Summary: Patient seen in physical due to left hip pain possibly due to osteoarthritis . Pt attended physical therapy to work on reducing pain levels, improving strength, and functional mobility.    Discharge Status: Discharge Status: Pt did not complete their POC. DC. Unable to assess their prognosis or understanding at this time.     Rehab Discharge Reason: Other Patient did not return to PT and chose to manage with HEP